# Patient Record
(demographics unavailable — no encounter records)

---

## 2024-10-30 NOTE — PHYSICAL EXAM
[de-identified] : Constitutional o Appearance : well-nourished, well developed, alert, in no acute distress Head and Face o Head :  Inspection : atraumatic, normocephalic o Face :  Inspection : no visible rash or discoloration Respiratory o Respiratory Effort: breathing unlabored Neurologic o Sensation : Normal sensation Psychiatric o Mood and Affect: mood normal, affect appropriate Lymphatic o Additional Nodes : No palpable lymph nodes present  Right Lower Extremity o Buttock : no tenderness, swelling or deformities o Right Hip :  Inspection/Palpation : well healed incisions from a hip fusion.   Range of Motion : restricted due to fusion, no motion  Stability : joint stability intact  Strength : extension, flexion, adduction, abduction, internal rotation and external rotation, 5/5 Tests: Julee's test negative   o Right Knee   Inspection/Palpation : , well-healed anterior incision, trophic changes, mild swelling,   Range of Motion : essentially no flexion, with significant crepitance, basically no patellofemoral glide   Stability : no valgus or varus instability present on provocative testing  Strength : flexion and extension 5/5  Tests and Signs : negative Anterior Drawer, negative Lachman, negative Olman   Left Lower Extremity o Buttock : no tenderness, swelling or deformities  o Left Hip :  Inspection/Palpation : no tenderness, no swelling or deformities  Range of Motion : full flexion and no rotation, painless, no crepitance  Stability : joint stability intact  Strength : extension, flexion, adduction, abduction, internal rotation and external rotation, 5/5  Tests: Julee's test negative   o Left Knee :  Inspection/Palpation :  medial or lateral compartment tenderness, mild swelling, well-healed lateral incision, trophic changes, no erythema, no warmth Range of Motion : 0-120, patellofemoral crepitance, good patellofemoral glide   Stability : valgus and varus instability present on provocative testing  Strength : flexion and extension 5/5  Tests and Signs : negative Anterior Drawer, negative Lachman, negative Olman   Gait and Station: Gait: stiff on right due to hip fusion, walking with a cane in the right hand, no significant extremity swelling or lymphedema, good proprioception and balance, no foot drop bilaterally, 0.25 inch shortening on the right leg  o Knee injection : Indication- knee osteoarthritis, Anatomic location- right intra-articular joint space, Spray - area was sterilized with Betadine and alcohol and anesthetized with Ethyl Chloride , needle used-20G, Medications given- 5cc's lidocaine, 0.5cc's kenalog, 0.5 cc's dexamethasone. The patient tolerated the procedure well.

## 2024-10-30 NOTE — DISCUSSION/SUMMARY
[de-identified] : I went over the pathophysiology of the patient's symptoms in great detail with the patient. I informed the patient they are due for a repeat gel injection any time after 1/10/2025 for the left knee and 2/1/2025 for the right knee. The patient elected to receive a cortisone injection into his right knee  today, and tolerated it well. I instructed the patient on ROM exercises, and told them to take it easy. The use of ice and rest was reviewed with the patient. The patient may resume activities tomorrow.   All of their questions were answered. They understand and consent to the plan.   FU after 1/10/2025. He will get bilateral knee X-rays when he returns.

## 2024-10-30 NOTE — ADDENDUM
[FreeTextEntry1] : I, TINA STEELE, acted solely as a scribe for Dr. Tomy Box on this date 10/30/2024.  All medical record entries made by the Scribe were at my, Dr. Tomy Box, direction and personally dictated by me on 10/30/2024. I have reviewed the chart and agree that the record accurately reflects my personal performance of the history, physical exam, assessment and plan. I have also personally directed, reviewed, and agreed with the chart.
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
## 2025-02-05 NOTE — DISCUSSION/SUMMARY
[de-identified] : I went over the pathophysiology of the patient's symptoms in great detail with the patient. I discussed the underlying pathophysiology of the patient's condition in great detail with the patient. I went over the patient's x-rays with them in great detail. The patient elected to receive a Durolane injection into his left knee today, and tolerated it well. I instructed the patient on ROM exercises, and told them to take it easy. The use of ice and rest was reviewed with the patient. The patient may resume activities tomorrow. I reminded the patient that it takes 4 to 6 weeks after the final injection to feel symptom relief.   All of their questions were answered. They understand and consent to the plan.   FU anytime for the right knee Durolane injection.

## 2025-02-05 NOTE — PHYSICAL EXAM
[de-identified] : Constitutional o Appearance : well-nourished, well developed, alert, in no acute distress Head and Face o Head :  Inspection : atraumatic, normocephalic o Face :  Inspection : no visible rash or discoloration Respiratory o Respiratory Effort: breathing unlabored Neurologic o Sensation : Normal sensation Psychiatric o Mood and Affect: mood normal, affect appropriate Lymphatic o Additional Nodes : No palpable lymph nodes present  Right Lower Extremity o Buttock : no tenderness, swelling or deformities o Right Hip :  Inspection/Palpation : well healed incisions from a hip fusion.   Range of Motion : restricted due to fusion, no motion  Stability : joint stability intact  Strength : extension, flexion, adduction, abduction, internal rotation and external rotation, 5/5 Tests: Julee's test negative   o Right Knee   Inspection/Palpation : , well-healed anterior incision, trophic changes, mild swelling,   Range of Motion : essentially no flexion, with significant crepitance, basically no patellofemoral glide   Stability : no valgus or varus instability present on provocative testing  Strength : flexion and extension 5/5  Tests and Signs : negative Anterior Drawer, negative Lachman, negative Olman   Left Lower Extremity o Buttock : no tenderness, swelling or deformities  o Left Hip :  Inspection/Palpation : no tenderness, no swelling or deformities  Range of Motion : full flexion and no rotation, painless, no crepitance  Stability : joint stability intact  Strength : extension, flexion, adduction, abduction, internal rotation and external rotation, 5/5  Tests: Julee's test negative   o Left Knee :  Inspection/Palpation :  medial or lateral compartment tenderness, mild swelling, well-healed lateral incision, trophic changes, no erythema, no warmth Range of Motion : 0-120, patellofemoral crepitance, good patellofemoral glide   Stability : valgus and varus instability present on provocative testing  Strength : flexion and extension 5/5  Tests and Signs : negative Anterior Drawer, negative Lachman, negative Olman   Gait and Station: Gait: stiff on right due to hip fusion, walking with a cane in the right hand, no significant extremity swelling or lymphedema, good proprioception and balance, no foot drop bilaterally, 0.25 inch shortening on the right leg  Radiology Results 2/5/2025  o Right Knee : Standing AP, lateral and tunnel views of the knee were obtained and revealed severe degenerative arthritis right knee with evidence of old  osteotomies of the distal femur and proximal tibia  o Left Knee : Standing AP, lateral and tunnel views of the knee were obtained and revealed moderate to severe tricompartmental osteoarthritis   o Knee injection : Indication- left knee osteoarthritis, Anatomic location- left intra-articular joint space, Spray - area was sterilized with Betadine and alcohol and anesthetized with Ethyl Chloride , needle used-20G, Medications given- 3cc's Durolane under Ultrasound guidance. The patient tolerated the procedure well.

## 2025-02-05 NOTE — ADDENDUM
[FreeTextEntry1] : I, TINA STEELE, acted solely as a scribe for Dr. Tomy Box on this date 02/05/2025.  All medical record entries made by the Scribe were at my, Dr. Tomy Box, direction and personally dictated by me on 02/05/2025. I have reviewed the chart and agree that the record accurately reflects my personal performance of the history, physical exam, assessment and plan. I have also personally directed, reviewed, and agreed with the chart.
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
## 2025-03-12 NOTE — DISCUSSION/SUMMARY
[de-identified] : I went over the pathophysiology of the patient's symptoms in great detail with the patient. I discussed the underlying pathophysiology of the patient's condition in great detail with the patient. I went over the patient's x-rays with them in great detail. The patient elected to receive a Durolane injection into his right knee today, and tolerated it well. I instructed the patient on ROM exercises, and told them to take it easy. The use of ice and rest was reviewed with the patient. The patient may resume activities tomorrow. I reminded the patient that it takes 4 to 6 weeks after the final injection to feel symptom relief.   All of their questions were answered. They understand and consent to the plan.   FU in 6 weeks

## 2025-03-12 NOTE — PHYSICAL EXAM
[de-identified] : Constitutional o Appearance : well-nourished, well developed, alert, in no acute distress Head and Face o Head :  Inspection : atraumatic, normocephalic o Face :  Inspection : no visible rash or discoloration Respiratory o Respiratory Effort: breathing unlabored Neurologic o Sensation : Normal sensation Psychiatric o Mood and Affect: mood normal, affect appropriate Lymphatic o Additional Nodes : No palpable lymph nodes present  Right Lower Extremity o Buttock : no tenderness, swelling or deformities o Right Hip :  Inspection/Palpation : well healed incisions from a hip fusion.   Range of Motion : restricted due to fusion, no motion  Stability : joint stability intact  Strength : extension, flexion, adduction, abduction, internal rotation and external rotation, 5/5 Tests: Julee's test negative   o Right Knee   Inspection/Palpation : , well-healed anterior incision, trophic changes, mild swelling,   Range of Motion : essentially no flexion, with significant crepitance, basically no patellofemoral glide   Stability : no valgus or varus instability present on provocative testing  Strength : flexion and extension 5/5  Tests and Signs : negative Anterior Drawer, negative Lachman, negative Olman   Left Lower Extremity o Buttock : no tenderness, swelling or deformities  o Left Hip :  Inspection/Palpation : no tenderness, no swelling or deformities  Range of Motion : full flexion and no rotation, painless, no crepitance  Stability : joint stability intact  Strength : extension, flexion, adduction, abduction, internal rotation and external rotation, 5/5  Tests: Julee's test negative   o Left Knee :  Inspection/Palpation :  medial or lateral compartment tenderness, mild swelling, well-healed lateral incision, trophic changes, no erythema, no warmth Range of Motion : 0-120, patellofemoral crepitance, good patellofemoral glide   Stability : valgus and varus instability present on provocative testing  Strength : flexion and extension 5/5  Tests and Signs : negative Anterior Drawer, negative Lachman, negative Olman   Gait and Station: Gait: stiff on right due to hip fusion, walking with a cane in the right hand, no significant extremity swelling or lymphedema, good proprioception and balance, no foot drop bilaterally, 0.25 inch shortening on the right leg  o Knee injection : Indication- right knee osteoarthritis, Anatomic location- right intra-articular joint space, Spray - area was sterilized with Betadine and alcohol and anesthetized with Ethyl Chloride , needle used-20G, Medications given- 3cc's Durolane under Ultrasound guidance. The patient tolerated the procedure well.

## 2025-03-12 NOTE — ADDENDUM
[FreeTextEntry1] : I, TINA STEELE, acted solely as a scribe for Dr. Tomy Box on this date 03/12/2025.  All medical record entries made by the Scribe were at my, Dr. Tomy Box, direction and personally dictated by me on 03/12/2025. I have reviewed the chart and agree that the record accurately reflects my personal performance of the history, physical exam, assessment and plan. I have also personally directed, reviewed, and agreed with the chart.

## 2025-04-16 NOTE — ADDENDUM
[FreeTextEntry1] : I, TINA STEELE, acted solely as a scribe for Dr. Tomy Box on this date 04/16/2025.  All medical record entries made by the Scribe were at my, Dr. Tomy Box, direction and personally dictated by me on 04/16/2025. I have reviewed the chart and agree that the record accurately reflects my personal performance of the history, physical exam, assessment and plan. I have also personally directed, reviewed, and agreed with the chart.

## 2025-04-16 NOTE — DISCUSSION/SUMMARY
[de-identified] : I went over the pathophysiology of the patient's symptoms in great detail with the patient. I informed the patient they are due for a repeat gel injection any time after [8/5/2025]. Proper cane walking protocol was discussed and demonstrated with the patient. We discussed the use of ice, Tylenol and anti-inflammatories to relieve pain.   All of their questions were answered. They understand and consent to the plan.   FU after 8/5/2025 for the left knee gel.
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
